# Patient Record
Sex: MALE | URBAN - METROPOLITAN AREA
[De-identification: names, ages, dates, MRNs, and addresses within clinical notes are randomized per-mention and may not be internally consistent; named-entity substitution may affect disease eponyms.]

---

## 2022-05-23 ENCOUNTER — TELEPHONE ENCOUNTER (OUTPATIENT)
Dept: URBAN - METROPOLITAN AREA CLINIC 106 | Facility: CLINIC | Age: 27
End: 2022-05-23

## 2022-06-10 ENCOUNTER — OFFICE VISIT (OUTPATIENT)
Dept: URBAN - METROPOLITAN AREA CLINIC 107 | Facility: CLINIC | Age: 27
End: 2022-06-10

## 2022-06-10 NOTE — HPI-TODAY'S VISIT:
26-year-old male With a history of hypertension, hyperlipidemia, obesity is referred by Dr. Cuca Henriquez for evaluation of abnormal liver tests.  A copy of today's visit will be forwarded to the referring provider. Per the referral note, patient recently started Accutane with dermatology and Biktarvy with Dr. Williamson. Labs 3/31/2022:Normal TSH, normal CBC, elevated glucose 1 1, normal TB, normal ALP, normal AST, mildly elevated ALT of 45.

## 2023-04-12 ENCOUNTER — TELEPHONE ENCOUNTER (OUTPATIENT)
Dept: URBAN - METROPOLITAN AREA CLINIC 113 | Facility: CLINIC | Age: 28
End: 2023-04-12